# Patient Record
Sex: MALE | Race: WHITE | NOT HISPANIC OR LATINO | Employment: OTHER | URBAN - METROPOLITAN AREA
[De-identification: names, ages, dates, MRNs, and addresses within clinical notes are randomized per-mention and may not be internally consistent; named-entity substitution may affect disease eponyms.]

---

## 2019-10-17 ENCOUNTER — OFFICE VISIT (OUTPATIENT)
Dept: UROLOGY | Facility: CLINIC | Age: 69
End: 2019-10-17
Payer: MEDICARE

## 2019-10-17 VITALS
HEIGHT: 74 IN | SYSTOLIC BLOOD PRESSURE: 112 MMHG | DIASTOLIC BLOOD PRESSURE: 66 MMHG | BODY MASS INDEX: 34.65 KG/M2 | RESPIRATION RATE: 18 BRPM | HEART RATE: 95 BPM | WEIGHT: 270 LBS

## 2019-10-17 DIAGNOSIS — Z90.79 STATUS POST PROSTATECTOMY: ICD-10-CM

## 2019-10-17 DIAGNOSIS — N39.45 URINARY INCONTINENCE WITH CONTINUOUS LEAKAGE: Primary | ICD-10-CM

## 2019-10-17 DIAGNOSIS — C61: ICD-10-CM

## 2019-10-17 PROBLEM — I10 HYPERTENSION: Status: ACTIVE | Noted: 2019-10-17

## 2019-10-17 PROBLEM — N19 ACUTE PRERENAL AZOTEMIA: Status: ACTIVE | Noted: 2019-10-17

## 2019-10-17 PROBLEM — J18.9 PNEUMONIA: Status: ACTIVE | Noted: 2019-10-17

## 2019-10-17 PROBLEM — E78.5 HYPERLIPIDEMIA: Status: ACTIVE | Noted: 2019-10-17

## 2019-10-17 PROCEDURE — 51798 US URINE CAPACITY MEASURE: CPT | Mod: ICN,CMP,S$GLB, | Performed by: NURSE PRACTITIONER

## 2019-10-17 PROCEDURE — 99213 OFFICE O/P EST LOW 20 MIN: CPT | Mod: 25,ICN,CMP,S$GLB | Performed by: NURSE PRACTITIONER

## 2019-10-17 PROCEDURE — 51798 PR MEAS,POST-VOID RES,US,NON-IMAGING: ICD-10-PCS | Mod: ICN,CMP,S$GLB, | Performed by: NURSE PRACTITIONER

## 2019-10-17 PROCEDURE — 99213 PR OFFICE/OUTPT VISIT, EST, LEVL III, 20-29 MIN: ICD-10-PCS | Mod: 25,ICN,CMP,S$GLB | Performed by: NURSE PRACTITIONER

## 2019-10-17 RX ORDER — PHENYLEPHRINE HCL 10 MG
1000 TABLET ORAL
COMMUNITY
End: 2021-04-01

## 2019-10-17 RX ORDER — PANTOPRAZOLE SODIUM 40 MG/1
40 TABLET, DELAYED RELEASE ORAL
COMMUNITY

## 2019-10-17 RX ORDER — DICLOFENAC SODIUM 75 MG/1
75 TABLET, DELAYED RELEASE ORAL
COMMUNITY

## 2019-10-17 RX ORDER — METFORMIN HYDROCHLORIDE 1000 MG/1
1000 TABLET ORAL
COMMUNITY
End: 2020-06-08

## 2019-10-17 RX ORDER — AMLODIPINE BESYLATE 5 MG/1
5 TABLET ORAL
COMMUNITY
End: 2019-10-17 | Stop reason: CLARIF

## 2019-10-17 RX ORDER — PRAVASTATIN SODIUM 10 MG/1
10 TABLET ORAL
COMMUNITY

## 2019-10-17 RX ORDER — MONTELUKAST SODIUM 10 MG/1
10 TABLET ORAL
COMMUNITY

## 2019-10-17 RX ORDER — AMLODIPINE BESYLATE 5 MG/1
5 TABLET ORAL DAILY
COMMUNITY
End: 2019-10-17 | Stop reason: CLARIF

## 2019-10-17 RX ORDER — ERGOCALCIFEROL 1.25 MG/1
CAPSULE ORAL
COMMUNITY
End: 2020-04-30

## 2019-10-17 RX ORDER — LOSARTAN POTASSIUM AND HYDROCHLOROTHIAZIDE 25; 100 MG/1; MG/1
TABLET ORAL
COMMUNITY
End: 2020-06-08

## 2019-10-17 RX ORDER — ASPIRIN 81 MG/1
81 TABLET ORAL
COMMUNITY

## 2019-10-17 NOTE — PROGRESS NOTES
Chief Complaint: postoperative urinary incontinence    HPI:    10/17/19: 69-year-old male who presents today with complaints of continuous urinary incontinence status post prostatectomy performed on 09/30/2019.  Procedure performed without complication and he was discharged home as scheduled.  Patient had Crow 8 disease and underwent prostatectomy as a 1st step in a multi modality treatment algorithm. He was then hospitalized from 10/6/19- 10/9/19 for treatment of UTI, which was treated successfully. He then returned to clinic on 10/11/19 for removal of Mathis catheter, cystogram revealed complete healing at the urethrovesical anastomosis.  Postop education provided on the need for extensive Kegel exercises as well as penile rehabilitation.  Patient reports today that since catheter removal he is having continuous urinary incontinence requiring the need for multiple Pull-Up changes per day.  He has attempted timed voiding, however, he states that as soon as he stands up urine begins to leak down his leg.  He is also wetting the bed at night. He denies any pain or blood noted in urine. No other urinary complaints such as burning with urination or malodorous urine. No other post op complaints.     Allergies:  Patient has no known allergies.    Medications:  has a current medication list which includes the following prescription(s): aspirin, cinnamon bark, diclofenac, ergocalciferol, losartan-hydrochlorothiazide 100-25 mg, metformin, montelukast, pantoprazole, and pravastatin.    Review of Systems:  General: No fever, chills, fatigability, or weight loss.  Skin: No rashes, itching, or changes in color or texture of skin.  Chest: Denies BARCENAS, cyanosis, wheezing, cough, and sputum production.  Abdomen: Appetite fine. No weight loss. Denies diarrhea, abdominal pain, hematemesis, or blood in stool.  Musculoskeletal: No joint stiffness or swelling. Denies back pain.  : As above.  All other review of systems  negative.    PMH:   has a past medical history of Diabetes mellitus, Hypertension, and Prostate cancer.    PSH:   has a past surgical history that includes Prostate surgery; Hemorrhoid surgery; and Tonsillectomy.    FamHx: family history includes Cancer in his brother; Stroke in his mother.    SocHx:  reports that he has never smoked. He does not have any smokeless tobacco history on file. His alcohol and drug histories are not on file.      Physical Exam:  Vitals:    10/17/19 0939   BP: 112/66   Pulse: 95   Resp: 18     General: A&Ox3, no apparent distress, no deformities  Neck: No masses, normal thyroid  Lungs: normal inspiration, no use of accessory muscles  Heart: normal pulse, no arrhythmias  Abdomen: Soft, NT, ND, no masses, no hernias, no hepatosplenomegaly  Lymphatic: Neck and groin nodes negative  Skin: The skin is warm and dry. No jaundice.  Ext: No c/c/e.  : deferred    Labs/Studies: bladder scan 0ml    Impression/Plan: Urinary incontinence with continuous leakage  Comments:  Continue timed voiding, Re-educated by LPN on Kegel exercises & penile rehabilitation, will reassess at next follow up  Orders:  -     Bladder scan    Malignant neoplasm of prostate with intermediate recurrence risk, stage T2B-C or Crow 7 or or prostate-specific antigen (PSA) 10-20  Comments:  s/p prostatectomy, will check 1st post op PSA at next appt    Status post prostatectomy  Comments:  Continue post prostatectomy care    Follow up in about 4 weeks (around 11/14/2019) for PSA.

## 2019-10-30 ENCOUNTER — TELEPHONE (OUTPATIENT)
Dept: UROLOGY | Facility: CLINIC | Age: 69
End: 2019-10-30

## 2019-10-30 NOTE — TELEPHONE ENCOUNTER
This pt/pt wife is requesting therapy bc he is still incontinent and thinks he is not doing exerices right. Pt does have kegel paper we give at office.     ----- Message from Raj Mobley sent at 10/29/2019  2:25 PM CDT -----  Contact: Pt  Kulwant called inquiring about how to perform Kegel exercises since he is still incontinent after his prostate surgery. Please call him at [phone] or [phone].

## 2019-11-14 ENCOUNTER — OFFICE VISIT (OUTPATIENT)
Dept: UROLOGY | Facility: CLINIC | Age: 69
End: 2019-11-14
Payer: MEDICARE

## 2019-11-14 VITALS
WEIGHT: 265 LBS | BODY MASS INDEX: 34.01 KG/M2 | HEIGHT: 74 IN | SYSTOLIC BLOOD PRESSURE: 127 MMHG | DIASTOLIC BLOOD PRESSURE: 58 MMHG | RESPIRATION RATE: 18 BRPM | HEART RATE: 99 BPM

## 2019-11-14 DIAGNOSIS — C61: Primary | ICD-10-CM

## 2019-11-14 DIAGNOSIS — N39.45 URINARY INCONTINENCE WITH CONTINUOUS LEAKAGE: ICD-10-CM

## 2019-11-14 DIAGNOSIS — Z90.79 STATUS POST PROSTATECTOMY: ICD-10-CM

## 2019-11-14 LAB — PSA, DIAGNOSTIC: < 0.01 NG/ML (ref 0.1–4)

## 2019-11-14 PROCEDURE — 99214 PR OFFICE/OUTPT VISIT, EST, LEVL IV, 30-39 MIN: ICD-10-PCS | Mod: S$GLB,,, | Performed by: NURSE PRACTITIONER

## 2019-11-14 PROCEDURE — 99214 OFFICE O/P EST MOD 30 MIN: CPT | Mod: S$GLB,,, | Performed by: NURSE PRACTITIONER

## 2019-11-14 RX ORDER — NYSTATIN 100000 U/G
CREAM TOPICAL 2 TIMES DAILY
Qty: 30 G | Refills: 3 | Status: SHIPPED | OUTPATIENT
Start: 2019-11-14 | End: 2020-04-30

## 2019-11-14 NOTE — PROGRESS NOTES
Chief Complaint: postoperative urinary incontinence    HPI:    69-year-old male known to Dr. Rene who presents today with continued complaints of continuous urinary incontinence status post prostatectomy performed on 09/30/2019.  Procedure performed without complication and he was discharged home as scheduled.  Patient had Crow 8 disease and underwent prostatectomy as a 1st step in a multi modality treatment algorithm. He was then hospitalized from 10/6/19- 10/9/19 for treatment of UTI, which was treated successfully. He then returned to clinic on 10/11/19 for removal of Mathis catheter, cystogram revealed complete healing at the urethrovesical anastomosis.  Postop education provided on the need for extensive Kegel exercises as well as penile rehabilitation.  Patient reports that since catheter removal he continues to have continuous urinary incontinence requiring the need for multiple Pull-Up changes per day- .  He has attempted timed voiding, however, he states that as soon as he stands up urine begins to leak down his leg.  His wife is unsure if he is performing the Kegel exercises correctly as the symptoms are not improving.  He continues to wet the bed at night, they are using Jignesh's Butt Paste & OTC antifungal but his groin area has become significantly red and irritated. He denies any pain or blood noted in urine. No other urinary complaints such as burning with urination or malodorous urine. No other post op complaints.     Allergies:  Patient has no known allergies.    Medications:  has a current medication list which includes the following prescription(s): aspirin, cinnamon bark, diclofenac, ergocalciferol, losartan-hydrochlorothiazide 100-25 mg, metformin, montelukast, nystatin, pantoprazole, and pravastatin.    Review of Systems:  General: No fever, chills, fatigability, or weight loss.  Skin: No rashes, itching, or changes in color or texture of skin.  Chest: Denies BARCENAS, cyanosis, wheezing,  cough, and sputum production.  Abdomen: Appetite fine. No weight loss. Denies diarrhea, abdominal pain, hematemesis, or blood in stool.  Musculoskeletal: No joint stiffness or swelling. Denies back pain.  : As above.  All other review of systems negative.    PMH:   has a past medical history of Diabetes mellitus, Hypertension, and Prostate cancer.    PSH:   has a past surgical history that includes Prostate surgery; Hemorrhoid surgery; and Tonsillectomy.    FamHx: family history includes Cancer in his brother; Stroke in his mother.    SocHx:  reports that he has never smoked. He does not have any smokeless tobacco history on file. He reports that he does not drink alcohol or use drugs.      Physical Exam:  Vitals:    11/14/19 0948   BP: (!) 127/58   Pulse: 99   Resp: 18     General: A&Ox3, no apparent distress, no deformities  Neck: No masses, normal thyroid  Lungs: normal inspiration, no use of accessory muscles  Heart: normal pulse, no arrhythmias  Abdomen: Soft, NT, ND, no masses, no hernias, no hepatosplenomegaly  Lymphatic: Neck and groin nodes negative  Skin: The skin is warm and dry. No jaundice.  Ext: No c/c/e.  : deferred    Labs/Studies: none    Impression/Plan: Malignant neoplasm of prostate with intermediate recurrence risk, stage T2B-C or Crow 7 or or prostate-specific antigen (PSA) 10-20  Comments:  s/p prostatectomy, PSA due to be checked today, will call pt with results  Orders:  -     Prostate Specific Antigen, Diagnostic    Urinary incontinence with continuous leakage  Comments:  referral sent to PT for pelvic floor therapy, continue Kegel exercises  Orders:  -     nystatin (MYCOSTATIN) cream; Apply topically 2 (two) times daily.  Dispense: 30 g; Refill: 3    Status post prostatectomy  Comments:  performed on 9/30/19 by Dr. Rene without complication    Follow up in about 3 months (around 2/14/2020) for f/u prostate cancer.

## 2019-11-15 ENCOUNTER — TELEPHONE (OUTPATIENT)
Dept: UROLOGY | Facility: CLINIC | Age: 69
End: 2019-11-15

## 2019-11-15 NOTE — TELEPHONE ENCOUNTER
----- Message from Chelsi Egan NP sent at 11/15/2019  1:45 PM CST -----  PSA undetectable, please call patient with results

## 2020-02-14 ENCOUNTER — TELEPHONE (OUTPATIENT)
Dept: UROLOGY | Facility: CLINIC | Age: 70
End: 2020-02-14

## 2020-02-14 ENCOUNTER — OFFICE VISIT (OUTPATIENT)
Dept: UROLOGY | Facility: CLINIC | Age: 70
End: 2020-02-14
Payer: MEDICARE

## 2020-02-14 VITALS
HEIGHT: 74 IN | HEART RATE: 91 BPM | SYSTOLIC BLOOD PRESSURE: 119 MMHG | RESPIRATION RATE: 12 BRPM | WEIGHT: 270 LBS | DIASTOLIC BLOOD PRESSURE: 78 MMHG | BODY MASS INDEX: 34.65 KG/M2

## 2020-02-14 DIAGNOSIS — C61 PROSTATE CANCER: Primary | ICD-10-CM

## 2020-02-14 DIAGNOSIS — N39.3 SUI (STRESS URINARY INCONTINENCE), MALE: ICD-10-CM

## 2020-02-14 LAB — PSA, DIAGNOSTIC: < 0.01 NG/ML (ref 0.1–4)

## 2020-02-14 PROCEDURE — 99213 PR OFFICE/OUTPT VISIT, EST, LEVL III, 20-29 MIN: ICD-10-PCS | Mod: S$GLB,,, | Performed by: SPECIALIST

## 2020-02-14 PROCEDURE — 99213 OFFICE O/P EST LOW 20 MIN: CPT | Mod: S$GLB,,, | Performed by: SPECIALIST

## 2020-02-14 RX ORDER — AMLODIPINE BESYLATE 5 MG/1
TABLET ORAL
COMMUNITY
Start: 2020-01-10 | End: 2021-04-01

## 2020-02-14 RX ORDER — TURMERIC 400 MG
CAPSULE ORAL
COMMUNITY
End: 2020-06-08

## 2020-02-14 NOTE — PROGRESS NOTES
Subjective:       Patient ID: Kulwant Hernandez is a 69 y.o. male.    Chief Complaint: Prostate Cancer (3 mth fu)      HPI:  69-year-old  man who underwent a robotic prostatectomy at [facility] on September 30, 2019 for high risk prostate cancer.  This was the 1st step in the multi modality treatment approach.    His hospital course was uncomplicated.  Following the procedure he was discharged home he developed a urinary tract infection who was readmitted to the hospital and was successfully treated.  He return to the clinic a cystogram showed complete healing at the urethrovesical anastomosis and his Mathis catheter was removed.    Since then he has been doing well other than persistent incontinence.  He has seen some improvement albeit slowly.  He reports that when he lays on in bed at night he can tell that his bladder is full he does not leak in the bed.  Initially when he was standing up against gravity the urine which is poor down but recently he has seen a little bit of an improvement in such a way that when he stands he is still able to at least pulled out his pants be able to void in a normal fashion. A few months ago he requested to be referred to pelvic floor physical therapy.  He is reporting today that he does not think is making a difference for him which I completely agree.  Even prior to physical therapy he was not doing his Kegel exercises religiously.    He did obtain a vacuum device for erections but he is not using it regularly.    His pathology from the surgery showed no lymph node involvement he had a primary histologic grade of Crow 4 + 3 equals 7 with 55% of pattern for reported there was seminal vesicular invasion on the right side margins were minimally involved and extraprostatic extension was also involved.  He had pathologic pT3b pN0 adenocarcinoma of the prostate.  Tumor was reported on both sides of the prostate without any lymphovascular invasion. Tumor  involving was about 25% of the prostate volume.  There was perineural invasion reported.    Past Medical History:   Past Medical History:   Diagnosis Date    Allergy     Diabetes mellitus     GERD (gastroesophageal reflux disease)     Hypercholesteremia     Hypertension     Prostate cancer        Past Surgical Historical:   Past Surgical History:   Procedure Laterality Date    bx vocal cords      HEMORRHOID SURGERY      PROSTATE SURGERY      TONSILLECTOMY          Medications:   Medication List with Changes/Refills   Current Medications    AMLODIPINE (NORVASC) 5 MG TABLET        ASPIRIN (ECOTRIN) 81 MG EC TABLET    81 mg.    CINNAMON BARK (CINNAMON) 500 MG CAPSULE    1,000 mg.    DICLOFENAC (VOLTAREN) 75 MG EC TABLET    75 mg.    ERGOCALCIFEROL (ERGOCALCIFEROL) 50,000 UNIT CAP    Every 7 Days    LOSARTAN-HYDROCHLOROTHIAZIDE 100-25 MG (HYZAAR) 100-25 MG PER TABLET    Daily    METFORMIN (GLUCOPHAGE) 1000 MG TABLET    1,000 mg.    MONTELUKAST (SINGULAIR) 10 MG TABLET    10 mg.    NYSTATIN (MYCOSTATIN) CREAM    Apply topically 2 (two) times daily.    PANTOPRAZOLE (PROTONIX) 40 MG TABLET    40 mg.    PRAVASTATIN (PRAVACHOL) 10 MG TABLET    10 mg.    TURMERIC 400 MG CAP            Past Social History:   Social History     Socioeconomic History    Marital status:      Spouse name: Not on file    Number of children: Not on file    Years of education: Not on file    Highest education level: Not on file   Occupational History    Not on file   Social Needs    Financial resource strain: Not on file    Food insecurity:     Worry: Not on file     Inability: Not on file    Transportation needs:     Medical: Not on file     Non-medical: Not on file   Tobacco Use    Smoking status: Never Smoker   Substance and Sexual Activity    Alcohol use: Never     Frequency: Never    Drug use: Never    Sexual activity: Not on file   Lifestyle    Physical activity:     Days per week: Not on file     Minutes per  session: Not on file    Stress: Not on file   Relationships    Social connections:     Talks on phone: Not on file     Gets together: Not on file     Attends Confucianist service: Not on file     Active member of club or organization: Not on file     Attends meetings of clubs or organizations: Not on file     Relationship status: Not on file   Other Topics Concern    Not on file   Social History Narrative    Not on file       Allergies: Review of patient's allergies indicates:  No Known Allergies     Family History:   Family History   Problem Relation Age of Onset    Stroke Mother     Cancer Brother         Review of Systems:   systems reviewed and notable for stress urinary incontinence following prostatectomy  All other systems were reviewed Neg except as stated in the HPI    Physical Exam:  General: A&Ox3. No apparent distress. No deformities.  Neck: No masses. Normal thyroid.  Lungs: normal inspiration. No use of accessory muscles.  Heart: normal pulse. No arrhythmias.  Abdomen: Soft. NT. ND. No masses. No hernias. No hepatosplenomegaly.  Lymphatic: Neck and groin nodes negative.  Skin: The skin is warm and dry. No jaundice.  Neurology: Cranial nerves 2-12 crossly intact, no focal weaknesses, no sensation deficits, no motor deficits  Ext: No clubbing, cyanosis or edema.  :  Deferred      Assessment/Plan:       69-year-old man with intermediate risk prostate cancer status post prostatectomy.  He had some adverse pathologic features on final pathology report.  Patient is doing well postprostatectomy urinary incontinence and erectile dysfunction.    1.  We are going to recommend for him to stop the pelvic floor physical therapy since he is not making a difference.  I am going to commit him to Kegel exercises.  I reinstructed him and retrain him how to do these exercises.  I need him to do him at least 100 of them a day.  Particularly inform him that he needs to hold his urine midstream to reassure him that  he is squeezing the right muscle group  2.  Obtain blood for serum PSA today give him a call with the results  3.  Return to clinic in 3 months.  4.  Resume the usage of vacuum device for erections once a day.    Problem List Items Addressed This Visit     None      Visit Diagnoses     Prostate cancer    -  Primary    WENDY (stress urinary incontinence), male

## 2020-03-04 ENCOUNTER — TELEPHONE (OUTPATIENT)
Dept: UROLOGY | Facility: CLINIC | Age: 70
End: 2020-03-04

## 2020-04-29 ENCOUNTER — TELEPHONE (OUTPATIENT)
Dept: UROLOGY | Facility: CLINIC | Age: 70
End: 2020-04-29

## 2020-04-29 NOTE — TELEPHONE ENCOUNTER
Call returned and spoke w/ wife. Pt's wife states that pt has been having blood in urine since last week. Pt went to [facility] in [place] last Friday and had UA culture done and was also prescribed Nitrofurantoin. Pt's wife states that their office called this morning and was told pt's UA culture was negative. Pt is requesting to see JENNA and appt is scheduled for 04/30/2020 at 0910. Pt's wife verbalized understanding.    UA results will be requested from [facility].    ----- Message from Raj Mobley sent at 4/29/2020  9:11 AM CDT -----  Contact: Pt  Please call Kulwant to discuss his gross hematuria [phone].

## 2020-04-30 ENCOUNTER — OFFICE VISIT (OUTPATIENT)
Dept: UROLOGY | Facility: CLINIC | Age: 70
End: 2020-04-30
Payer: MEDICARE

## 2020-04-30 ENCOUNTER — TELEPHONE (OUTPATIENT)
Dept: UROLOGY | Facility: CLINIC | Age: 70
End: 2020-04-30

## 2020-04-30 VITALS
RESPIRATION RATE: 18 BRPM | HEIGHT: 74 IN | SYSTOLIC BLOOD PRESSURE: 139 MMHG | DIASTOLIC BLOOD PRESSURE: 86 MMHG | HEART RATE: 104 BPM | WEIGHT: 270 LBS | BODY MASS INDEX: 34.65 KG/M2

## 2020-04-30 DIAGNOSIS — R31.0 GROSS HEMATURIA: Primary | ICD-10-CM

## 2020-04-30 DIAGNOSIS — N30.01 ACUTE CYSTITIS WITH HEMATURIA: ICD-10-CM

## 2020-04-30 DIAGNOSIS — C61 PROSTATE CANCER: ICD-10-CM

## 2020-04-30 DIAGNOSIS — Z90.79 S/P PROSTATECTOMY: ICD-10-CM

## 2020-04-30 LAB
ANION GAP SERPL CALC-SCNC: 17 MMOL/L (ref 8–17)
BILIRUB UR QL STRIP: NEGATIVE
BUN/CREAT SERPL: 18.3 (ref 6–20)
CALCIUM SERPL-MCNC: 9.5 MG/DL (ref 8.6–10.2)
CARBON DIOXIDE, CO2: 24 MMOL/L (ref 22–29)
CHLORIDE: 95 MMOL/L (ref 98–107)
CREAT SERPL-MCNC: 1.02 MG/DL (ref 0.7–1.2)
GFR ESTIMATION: 72.2
GLUCOSE UR QL STRIP: POSITIVE
GLUCOSE: 392 MG/DL (ref 82–115)
KETONES UR QL STRIP: POSITIVE
LEUKOCYTE ESTERASE UR QL STRIP: POSITIVE
PH, POC UA: 5.5
POC AMORP, URINE: ABNORMAL
POC BACTI, URINE: ABNORMAL
POC BLOOD, URINE: POSITIVE
POC CASTS, URINE: ABNORMAL
POC CRYST, URINE: ABNORMAL
POC EPITH, URINE: NEGATIVE
POC HCG, URINE: ABNORMAL
POC HYALIN, URINE: 0 LPF
POC MUCUS, URINE: ABNORMAL
POC NITRATES, URINE: NEGATIVE
POC OTHER, URINE: ABNORMAL
POC RBC, URINE: ABNORMAL HPF
POC WBC, URINE: ABNORMAL HPF
POTASSIUM: 3.8 MMOL/L (ref 3.5–5.1)
PROT UR QL STRIP: NEGATIVE
PSA, DIAGNOSTIC: <0.014 NG/ML (ref 0–4)
SODIUM: 136 MMOL/L (ref 136–145)
SP GR UR STRIP: 1.01 (ref 1–1.03)
UREA NITROGEN (BUN): 18.7 MG/DL (ref 8–23)
UROBILINOGEN UR STRIP-ACNC: 0.2 (ref 0.3–2.2)

## 2020-04-30 PROCEDURE — 81001 PR  URINALYSIS, AUTO, W/SCOPE: ICD-10-PCS | Mod: S$GLB,,, | Performed by: NURSE PRACTITIONER

## 2020-04-30 PROCEDURE — 81001 URINALYSIS AUTO W/SCOPE: CPT | Mod: S$GLB,,, | Performed by: NURSE PRACTITIONER

## 2020-04-30 PROCEDURE — 99213 PR OFFICE/OUTPT VISIT, EST, LEVL III, 20-29 MIN: ICD-10-PCS | Mod: 25,S$GLB,, | Performed by: NURSE PRACTITIONER

## 2020-04-30 PROCEDURE — 36415 COLL VENOUS BLD VENIPUNCTURE: CPT | Mod: S$GLB,,, | Performed by: NURSE PRACTITIONER

## 2020-04-30 PROCEDURE — 36415 PR COLLECTION VENOUS BLOOD,VENIPUNCTURE: ICD-10-PCS | Mod: S$GLB,,, | Performed by: NURSE PRACTITIONER

## 2020-04-30 PROCEDURE — 99213 OFFICE O/P EST LOW 20 MIN: CPT | Mod: 25,S$GLB,, | Performed by: NURSE PRACTITIONER

## 2020-04-30 RX ORDER — NITROFURANTOIN 25; 75 MG/1; MG/1
CAPSULE ORAL
COMMUNITY
Start: 2020-04-22 | End: 2020-05-15

## 2020-04-30 NOTE — TELEPHONE ENCOUNTER
Spoke w/ pt's wife and she was informed of pt's lab results and metformin protocol.    Pt's wife verbalized understanding.     ----- Message from Chelsi Egan NP sent at 4/30/2020  1:09 PM CDT -----  Also notify him of the metformin protocol

## 2020-04-30 NOTE — PROGRESS NOTES
Chief Complaint:   Chief Complaint   Patient presents with    Hematuria     1 wk/went urgent care in [facility]       HPI:  70-year-old  male known to the service of Dr. Rene who presents with complaints of hematuria.  Patient initially noted blood in his urine 1 week ago, presented to [facility] in [place] and was diagnosed with UTI and prescribed nitrofurantoin (still on medication currently).  He states that the hematuria resolved, but then he noted it again in his urine yesterday.  He reports intermittent burning with urination.  He denies previous workup for hematuria.  He is a nonsmoker.  No known family history of bladder cancer.  No significant chemical exposure.  His final culture results from Urgent Care on 04/22/2020 reveals mixed organisms, consistent with contaminated specimen.    He has high risk prostate cancer.  He underwent a robotic prostatectomy on September 30, 2019.  His PSA has remained undetectable.  He is due for repeat PSA check today.    He was having persistent incontinence post surgery.  He states that this symptom has almost completely resolved.    His final pathology showed primary histologic grade of Crow 4+3=7 with 55% pattern 4 reported.  There was no lymph node involvement.  There was seminal vesicle invasion on the right side.  Margins were minimally involved.  Extraprostatic extension was also involved.  He had pathologic stage pT3b pN0 adenocarcinoma of the prostate.  Tumor was reported on both sides of the prostate without any lymphovascular invasion.  Tumor involving was about 25% of the prostate volume.  There was perineural invasion reported.      Allergies:  Patient has no known allergies.    Medications:  has a current medication list which includes the following prescription(s): amlodipine, aspirin, cinnamon bark, diclofenac, losartan-hydrochlorothiazide 100-25 mg, metformin, montelukast, nitrofurantoin (macrocrystal-monohydrate),  pantoprazole, pravastatin, and turmeric.    Review of Systems:  General: No fever, chills, vision changes, dizziness, weakness, fatigue, unexplained weight loss, confusion, or mood swings.  Skin: No rashes, itching, or changes in color/texture of skin.  Chest: Denies BARCENAS, cyanosis, wheezing, cough, and sputum production.  Abdomen: Appetite fine. Denies diarrhea, abdominal pain, hematemesis, or blood in stool.  Musculoskeletal: No joint stiffness or swelling. No painful lymph nodes.  : reviewed and negative except as stated above in the HPI.  All other review of systems negative.    PMH:   has a past medical history of Allergy, Diabetes mellitus, GERD (gastroesophageal reflux disease), Hypercholesteremia, Hypertension, and Prostate cancer.    PSH:   has a past surgical history that includes Prostate surgery; Hemorrhoid surgery; Tonsillectomy; and bx vocal cords.    FamHx: family history includes Cancer in his brother; Stroke in his mother.    SocHx:  reports that he has never smoked. He does not have any smokeless tobacco history on file. He reports that he does not drink alcohol or use drugs.      Physical Exam:  Vitals:    04/30/20 0906   BP: 139/86   Pulse: 104   Resp: 18     General: AAOx3, no apparent distress, no deformities  Neck: supple, no masses, normal thyroid, full ROM  Lungs: CTAB, no adventitious breath sounds, normal inspiration, no use of accessory muscles  Heart: regular rate and rhythm, no arrhythmias  Abdomen: soft, NT, ND, no masses, no hernias, no hepatosplenomegaly  Lymphatic: no unusually enlarged or tender lymph nodes  Skin: warm and dry, no jaundice, no rash  Ext: without edema or deformity, TRUJILLO, ambulates independently  : deferred    Labs/Studies: UA reveals WBCs 10-16/hpf, RBCs /hpf, epi neg, bact 1+; urine culture from outside facility on 4/22/2020 reveals mixed colonies probable contamination    Impression/Plan:   Gross hematuria  Comments:  no prior workup, UA reveals RBCs  /hpf, will plan for cysto & CT urogram for further eval   Orders:  -     POCT Urinalysis (w/Micro Option)  -     Urine culture  -     Basic metabolic panel    Acute cystitis with hematuria  Comments:  diagnosed by urgent care, currently on nitrofurantoin, UA reveals WBCs 10-16/hpf & bateria 1+ so will resend for culture    Prostate cancer  Comments:  s/p prostatectomy, PSA has remained undetectable, recheck PSA today  Orders:  -     Prostate Specific Antigen, Diagnostic    S/P prostatectomy  Comments:  performed on 9/30/2019        Follow up in about 3 months (around 7/30/2020) for 3m follow up prostate cancer/will also plan for cysto in [facility] for eval of hematuria.

## 2020-05-01 ENCOUNTER — TELEPHONE (OUTPATIENT)
Dept: UROLOGY | Facility: CLINIC | Age: 70
End: 2020-05-01

## 2020-05-01 LAB — URINE CULTURE, ROUTINE: NORMAL

## 2020-05-01 NOTE — TELEPHONE ENCOUNTER
Called pt and spoke w/ pt's wife. Result was given and pt's wife verbalized understanding.     ----- Message from Chelsi Egan NP sent at 5/1/2020  8:12 AM CDT -----  Urine culture reviewed, no growth of bacteria

## 2020-05-07 DIAGNOSIS — N32.0 BNC (BLADDER NECK CONTRACTURE): Primary | ICD-10-CM

## 2020-05-07 DIAGNOSIS — N48.9 PENILE LESION: ICD-10-CM

## 2020-05-15 ENCOUNTER — PROCEDURE VISIT (OUTPATIENT)
Dept: UROLOGY | Facility: CLINIC | Age: 70
End: 2020-05-15
Payer: MEDICARE

## 2020-05-15 VITALS
HEIGHT: 74 IN | BODY MASS INDEX: 34.52 KG/M2 | OXYGEN SATURATION: 95 % | HEART RATE: 111 BPM | SYSTOLIC BLOOD PRESSURE: 121 MMHG | DIASTOLIC BLOOD PRESSURE: 79 MMHG | WEIGHT: 269 LBS | RESPIRATION RATE: 18 BRPM

## 2020-05-15 DIAGNOSIS — R31.0 GROSS HEMATURIA: ICD-10-CM

## 2020-05-15 PROCEDURE — 52000 PR CYSTOURETHROSCOPY: ICD-10-PCS | Mod: S$GLB,,, | Performed by: SPECIALIST

## 2020-05-15 PROCEDURE — 52000 CYSTOURETHROSCOPY: CPT | Mod: S$GLB,,, | Performed by: SPECIALIST

## 2020-05-15 RX ORDER — CIPROFLOXACIN 500 MG/1
500 TABLET ORAL
Status: COMPLETED | OUTPATIENT
Start: 2020-05-15 | End: 2020-05-15

## 2020-05-15 RX ADMIN — CIPROFLOXACIN 500 MG: 500 TABLET ORAL at 03:05

## 2020-05-15 NOTE — PROCEDURES
"Cystoscopy  Date/Time: 5/15/2020 3:20 PM  Performed by: Ronnie Rene MD  Authorized by: Ronnie Rene MD     Consent Done?:  Yes (Written)  Time out: Immediately prior to procedure a "time out" was called to verify the correct patient, procedure, equipment, support staff and site/side marked as required.    Indications: hematuria    Position:  Supine  Anesthesia:  Intraurethral instillation  Preparation: Patient was prepped and draped in usual sterile fashion      Scope type:  Flexible cystoscope  External exam normal: No    Circumcised: No         Urethral Meatus Normal: Yes    Meatal stenosis: No         Condyloma: Yes    Urethra normal: Yes    Prostate normal: No (Surgically absent)    Bladder neck normal: Bladder neck abnormal (bladder neck contracture)    Patient tolerance:  Patient tolerated the procedure well with no immediate complications     Cystoscopy could not be completed because patient had a bladder neck contracture we could not get past this contracture into the bladder.  He also has some lesions on his external genitalia which looks like condyloma.  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  The plan will be to take him to the operating room for bladder neck incision cystoscopy biopsy of external penile lesions.      "

## 2020-05-15 NOTE — PATIENT INSTRUCTIONS
Cystoscopy    Cystoscopy is a procedure that lets your doctor look directly inside your urethra and bladder. It can be used to:  · Help diagnose a problem with your urethra, bladder, or kidneys.  · Take a sample (biopsy) of bladder or urethral tissue.  · Treat certain problems (such as removing kidney stones).  · Place a stent to bypass an obstruction.  · Take special X-rays of the kidneys.  Based on the findings, your doctor may recommend other tests or treatments.  What is a cystoscope?  A cystoscope is a telescope-like instrument that contains lenses and fiberoptics (small glass wires that make bright light). The cystoscope may be straight and rigid, or flexible to bend around curves in the urethra. The doctor may look directly into the cystoscope, or project the image onto a monitor.  Getting ready  · Ask your doctor if you should stop taking any medicines before the procedure.  · Ask whether you should avoid eating or drinking anything after midnight before the procedure.  · Follow any other instructions your doctor gives you.  Tell your doctor before the exam if you:  · Take any medicines, such as aspirin or blood thinners  · Have allergies to any medicines  · Are pregnant   The procedure  Cystoscopy is done in the doctors office, surgery center, or hospital. The doctor and a nurse are present during the procedure. It takes only a few minutes, longer if a biopsy, X-ray, or treatment needs to be done.  During the procedure:  · You lie on an exam table on your back, knees bent and legs apart. You are covered with a drape.  · Your urethra and the area around it are washed. Anesthetic jelly may be applied to numb the urethra. Other pain medicine is usually not needed. In some cases, you may be offered a mild sedative to help you relax. If a more extensive procedure is to be done, such as a biopsy or kidney stone removal, general anesthesia may be needed.  · The cystoscope is inserted. A sterile fluid is put  into the bladder to expand it. You may feel pressure from this fluid.  · When the procedure is done, the cystoscope is removed.  After the procedure  If you had a sedative, general anesthesia, or spinal anesthesia, you must have someone drive you home. Once youre home:  · Drink plenty of fluids.  · You may have burning or light bleeding when you urinate--this is normal.  · Medicines may be prescribed to ease any discomfort or prevent infection. Take these as directed.  · Call your doctor if you have heavy bleeding or blood clots, burning that lasts more than a day, a fever over 100°F  (38° C), or trouble urinating.  Date Last Reviewed: 1/1/2017  © 9343-4610 [employer]. [address] [place] [address], [place] [address]. All rights reserved. This information is not intended as a substitute for professional medical care. Always follow your healthcare professional's instructions.

## 2020-05-26 LAB
ANION GAP SERPL CALC-SCNC: 11 MMOL/L (ref 3–11)
APPEARANCE, UA: CLEAR
APTT PPP: 27 SEC (ref 19.6–32.4)
BACTERIA SPEC CULT: ABNORMAL /HPF
BASOPHILS NFR SNV MANUAL: 1.2 % (ref 0–3)
BILIRUB UR QL STRIP: NEGATIVE MG/DL
BUN SERPL-MCNC: 20 MG/DL (ref 7–18)
BUN/CREAT SERPL: 16.8 RATIO (ref 7–18)
CALCIUM BLD-MCNC: NORMAL MG/DL
CALCIUM SERPL-MCNC: 9 MG/DL (ref 8.8–10.5)
CHLORIDE SERPL-SCNC: 102 MMOL/L (ref 100–108)
CO2 SERPL-SCNC: 28 MMOL/L (ref 21–32)
COLOR UR: ABNORMAL
COVID-19 AB, IGM: NORMAL
CREAT SERPL-MCNC: 1.19 MG/DL (ref 0.7–1.3)
EOSINOPHIL NFR SNV MANUAL: 3 % (ref 1–3)
ERYTHROCYTE [DISTWIDTH] IN BLOOD BY AUTOMATED COUNT: 13.2 % (ref 12.5–18)
GFR ESTIMATION: 60
GLUCOSE (UA): NORMAL MG/DL
GLUCOSE SERPL-MCNC: 131 MG/DL (ref 70–110)
HCT VFR BLD AUTO: 39.8 % (ref 42–52)
HGB BLD-MCNC: 12.5 G/DL (ref 14–18)
HGB UR QL STRIP: 150 /UL
INR PPP: 1.1 INR (ref 0.9–1.1)
KETONES UR QL STRIP: ABNORMAL MG/DL
LEUKOCYTE ESTERASE UR QL STRIP: 500 /UL
LYMPHOCYTES NFR SNV MANUAL: 31.8 % (ref 25–40)
MANUAL NRBC PER 100 CELLS: 0 %
MCH RBC QN AUTO: 29.7 PG (ref 27–31.2)
MCHC RBC AUTO-ENTMCNC: 31.4 G/DL (ref 31.8–35.4)
MCV RBC AUTO: 94.5 FL (ref 80–97)
MONOCYTES/100 LEUKOCYTES: 11.5 % (ref 1–15)
NEUTROPHILS NFR BLD: 2.58 10*3/UL (ref 1.8–7.7)
NEUTROPHILS NFR SNV MANUAL: 52.3 % (ref 37–80)
NITRITE UR QL STRIP: NEGATIVE
PH UR STRIP: 5 PH (ref 5–9)
PLATELETS: 211 10*3/UL (ref 142–424)
POTASSIUM SERPL-SCNC: 4.1 MMOL/L (ref 3.6–5.2)
PROT UR QL STRIP: 30 MG/DL
PROTHROMBIN TIME: 12.5 SEC (ref 10.6–13)
RBC # BLD AUTO: 4.21 10*6/UL (ref 4.7–6.1)
RBC #/AREA URNS HPF: ABNORMAL /HPF (ref 0–2)
SERVICE COMMENT 03: ABNORMAL
SODIUM BLD-SCNC: 141 MMOL/L (ref 135–145)
SP GR UR STRIP: 1.02 (ref 1–1.03)
SPECIMEN COLLECTION METHOD, URINE: ABNORMAL
SQUAMOUS EPITHELIAL, UA: ABNORMAL /LPF
UROBILINOGEN UR STRIP-ACNC: NORMAL MG/DL
WBC # BLD: 4.9 10*3/UL (ref 4.6–10.2)
WBC #/AREA URNS HPF: ABNORMAL /HPF (ref 0–5)

## 2020-06-02 ENCOUNTER — OUTSIDE PLACE OF SERVICE (OUTPATIENT)
Dept: UROLOGY | Facility: CLINIC | Age: 70
End: 2020-06-02
Payer: MEDICARE

## 2020-06-02 LAB
GLUCOSE SERPL-MCNC: 111 MG/DL (ref 70–105)
GLUCOSE SERPL-MCNC: 125 MG/DL (ref 70–105)

## 2020-06-02 PROCEDURE — 52310 PR CYSTOSCOPY,REMV CALCULUS,SIMPLE: ICD-10-PCS | Mod: 59,,, | Performed by: SPECIALIST

## 2020-06-02 PROCEDURE — 54060 PR DESTR PENIS LESN,SIMPL,SURG EXCIS: ICD-10-PCS | Mod: 51,,, | Performed by: SPECIALIST

## 2020-06-02 PROCEDURE — 52640 RELIEVE BLADDER CONTRACTURE: CPT | Mod: ,,, | Performed by: SPECIALIST

## 2020-06-02 PROCEDURE — 11421 PR EXC SKIN BENIG 0.6-1 CM REMAINDR BODY: ICD-10-PCS | Mod: 51,,, | Performed by: SPECIALIST

## 2020-06-02 PROCEDURE — 52640 PR RELIEVE POSTOP BLADDER CONTRACTURE: ICD-10-PCS | Mod: ,,, | Performed by: SPECIALIST

## 2020-06-02 PROCEDURE — 11421 EXC H-F-NK-SP B9+MARG 0.6-1: CPT | Mod: 51,,, | Performed by: SPECIALIST

## 2020-06-02 PROCEDURE — 54060 EXCISION OF PENIS LESION(S): CPT | Mod: 51,,, | Performed by: SPECIALIST

## 2020-06-02 PROCEDURE — 52310 CYSTOSCOPY AND TREATMENT: CPT | Mod: 59,,, | Performed by: SPECIALIST

## 2020-06-08 ENCOUNTER — OFFICE VISIT (OUTPATIENT)
Dept: UROLOGY | Facility: CLINIC | Age: 70
End: 2020-06-08
Payer: MEDICARE

## 2020-06-08 VITALS — SYSTOLIC BLOOD PRESSURE: 124 MMHG | HEART RATE: 94 BPM | DIASTOLIC BLOOD PRESSURE: 65 MMHG

## 2020-06-08 DIAGNOSIS — R31.0 GROSS HEMATURIA: ICD-10-CM

## 2020-06-08 DIAGNOSIS — Z90.79 S/P PROSTATECTOMY: ICD-10-CM

## 2020-06-08 DIAGNOSIS — C61 PROSTATE CANCER: ICD-10-CM

## 2020-06-08 DIAGNOSIS — N32.0 BLADDER NECK CONTRACTURE: Primary | ICD-10-CM

## 2020-06-08 DIAGNOSIS — A63.0 CONDYLOMA ACUMINATUM OF PENIS: ICD-10-CM

## 2020-06-08 PROCEDURE — 99024 PR POST-OP FOLLOW-UP VISIT: ICD-10-PCS | Mod: S$GLB,POP,, | Performed by: SPECIALIST

## 2020-06-08 PROCEDURE — 99024 POSTOP FOLLOW-UP VISIT: CPT | Mod: S$GLB,POP,, | Performed by: SPECIALIST

## 2020-06-08 RX ORDER — TAMSULOSIN HYDROCHLORIDE 0.4 MG/1
CAPSULE ORAL
COMMUNITY
Start: 2020-05-05 | End: 2020-07-30

## 2020-06-08 RX ORDER — HYOSCYAMINE SULFATE 0.125 MG
TABLET ORAL
COMMUNITY
Start: 2020-06-04 | End: 2021-04-01

## 2020-06-08 RX ORDER — BLOOD-GLUCOSE METER
EACH MISCELLANEOUS
COMMUNITY
Start: 2020-05-06 | End: 2021-08-06

## 2020-06-08 RX ORDER — ERGOCALCIFEROL 1.25 MG/1
CAPSULE ORAL
COMMUNITY

## 2020-06-08 RX ORDER — BLOOD SUGAR DIAGNOSTIC
STRIP MISCELLANEOUS
COMMUNITY
Start: 2020-05-06 | End: 2021-08-06

## 2020-06-08 RX ORDER — LOSARTAN POTASSIUM 100 MG/1
TABLET ORAL
COMMUNITY
Start: 2020-06-01

## 2020-06-08 RX ORDER — MELOXICAM 7.5 MG/1
TABLET ORAL
COMMUNITY
Start: 2020-05-18 | End: 2020-07-30

## 2020-06-08 RX ORDER — GLIPIZIDE 10 MG/1
TABLET ORAL
COMMUNITY
Start: 2020-06-04

## 2020-06-08 NOTE — PROGRESS NOTES
Chief Complaint:   Chief Complaint   Patient presents with    Follow-up     espinoza DC       HPI:   70-year-old  male known to the service of Dr. Rene who presents for post-op visit with Espinoza catheter removal.  He presented recently with complaints of gross hematuria, cystoscopy revealed a bladder neck contracture.  Incidentally he also had some penile condyloma.  He underwent transurethral resection of bladder neck contracture, cystourethroscopy with removal of foreign body from the bladder, excision of penile condyloma x2, and Espinoza catheterization on June 2, 2020.  He presents today for Espinoza catheter removal.  Denies any further episodes of blood noted in urine.  No new urological complaints.    Pathology results reviewed, bladder neck tissue showed chronically inflamed fibrous tissue and polarizable foreign body material.  Genital condyloma reveals condyloma acuminatum.     He has high risk prostate cancer.  He underwent a robotic prostatectomy on September 30, 2019.  His PSA has remained undetectable.  He is due for 3 month follow-up on 7/30/2020.     Allergies:  Patient has no known allergies.    Medications:  has a current medication list which includes the following prescription(s): amlodipine, aspirin, cinnamon bark, diclofenac, ergocalciferol, glipizide, hyoscyamine, losartan, meloxicam, montelukast, pantoprazole, pravastatin, relion prime meter, relion prime test strips, and tamsulosin.    Review of Systems:  General: No fever, chills, vision changes, dizziness, weakness, fatigue, unexplained weight loss, confusion, or mood swings.  Skin: No rashes, itching, or changes in color/texture of skin.  Chest: Denies BARCENAS, cyanosis, wheezing, cough, and sputum production.  Abdomen: Appetite fine. Denies diarrhea, abdominal pain, hematemesis, or blood in stool.  Musculoskeletal: No joint stiffness or swelling. No painful lymph nodes.  : reviewed and negative except as stated above in the HPI.  All  other review of systems negative.    PMH:   has a past medical history of Allergy, Diabetes mellitus, GERD (gastroesophageal reflux disease), Hypercholesteremia, Hypertension, and Prostate cancer.    PSH:   has a past surgical history that includes Prostate surgery; Hemorrhoid surgery; Tonsillectomy; and bx vocal cords.    FamHx: family history includes Cancer in his brother; Stroke in his mother.    SocHx:  reports that he has never smoked. He does not have any smokeless tobacco history on file. He reports that he does not drink alcohol or use drugs.      Physical Exam:  Vitals:    06/08/20 1522   BP: 124/65   Pulse: 94     General: AAOx3, no apparent distress, no deformities  Neck: supple, no masses, normal thyroid, full ROM  Lungs: CTAB, no adventitious breath sounds, normal inspiration, no use of accessory muscles  Heart: regular rate and rhythm, no arrhythmias  Abdomen: soft, NT, ND, no masses, no hernias, no hepatosplenomegaly  Lymphatic: no unusually enlarged or tender lymph nodes  Skin: warm and dry, no jaundice, no rash  Ext: without edema or deformity, TRUJILLO, ambulates independently  :  Mathis catheter in place draining a small amount of clear yellow urine    Labs/Studies: pathology results as above    Impression/Plan:   Bladder neck contracture  Comments:  s/p transurethral resection on 6/2/2020, DC Mathis catheter today    Gross hematuria  Comments:  resolved, due to above, s/p transurethral resection of bladder neck contracture, no further episodes  Orders:  -     Nursing communication    Condyloma acuminatum of penis  Comments:  s/p excision on 6/2/2020, pathology confirms diagnosis    Prostate cancer  Comments:  s/p prostatectomy, 3m follow up scheduled in 7/2020    S/P prostatectomy  Comments:  9/30/2019        Follow up in about 7 weeks (around 7/30/2020) for 3m follow up as previously scheduled.

## 2020-06-08 NOTE — PROGRESS NOTES
Patient seen and examined.  Reviewed clinical data.  I discussed the management plan with the nurse practitioner.  I agree with her assessment and plan.      Briefly this is a 70-year-old man with a history of prostate cancer status post robotic prostatectomy September 2019 who developed a bladder neck contracture.  He also had a foreign body in his bladder.  He is now status post removal of this foreign body and excision of a bladder neck contracture.  He is here today for catheter removal.  We also did remove some condyloma on his scrotum on his penis.  We should discuss the pathology with him today.    Plan will be to remove the Mathis catheter today and have patient follow up with us in July of 2020.  I expect him to leak some more and we should talk at that time about some interventions for stress incontinence.

## 2020-06-10 ENCOUNTER — TELEPHONE (OUTPATIENT)
Dept: UROLOGY | Facility: CLINIC | Age: 70
End: 2020-06-10

## 2020-06-10 NOTE — TELEPHONE ENCOUNTER
Spoke with pt spouse and states that pt is having a lot of leakage. She states that he has not symptoms of COVID-19. Spouse to make JENNA aware of the leakage since having the catheter removed. Nurse verbalized that she would notify JENNA. NB      ----- Message from Selena Phelps sent at 6/8/2020 10:45 AM CDT -----  Regarding: COVID FU  SURGERY-6/2/2020

## 2020-06-12 NOTE — TELEPHONE ENCOUNTER
Yes I expected him to leak more after the catheter was removed.  Because he had a foreign body that was obstructing of the bladder neck that was preventing him from leaking.  That would also cause the hematuria.  We can discuss some options based on how long it has been since he had his prostate removed.

## 2020-07-30 ENCOUNTER — OFFICE VISIT (OUTPATIENT)
Dept: UROLOGY | Facility: CLINIC | Age: 70
End: 2020-07-30
Payer: MEDICARE

## 2020-07-30 ENCOUNTER — TELEPHONE (OUTPATIENT)
Dept: UROLOGY | Facility: CLINIC | Age: 70
End: 2020-07-30

## 2020-07-30 VITALS
WEIGHT: 269 LBS | RESPIRATION RATE: 18 BRPM | HEIGHT: 74 IN | SYSTOLIC BLOOD PRESSURE: 121 MMHG | BODY MASS INDEX: 34.52 KG/M2 | HEART RATE: 86 BPM | DIASTOLIC BLOOD PRESSURE: 67 MMHG

## 2020-07-30 DIAGNOSIS — Z90.79 S/P PROSTATECTOMY: ICD-10-CM

## 2020-07-30 DIAGNOSIS — C61 PROSTATE CANCER: Primary | ICD-10-CM

## 2020-07-30 DIAGNOSIS — N39.3 SUI (STRESS URINARY INCONTINENCE), MALE: ICD-10-CM

## 2020-07-30 DIAGNOSIS — N32.0 BLADDER NECK CONTRACTURE: ICD-10-CM

## 2020-07-30 LAB — PSA, DIAGNOSTIC: <0.014 NG/ML (ref 0–4)

## 2020-07-30 PROCEDURE — 99213 OFFICE O/P EST LOW 20 MIN: CPT | Mod: 24,S$GLB,, | Performed by: SPECIALIST

## 2020-07-30 PROCEDURE — 99213 PR OFFICE/OUTPT VISIT, EST, LEVL III, 20-29 MIN: ICD-10-PCS | Mod: 24,S$GLB,, | Performed by: SPECIALIST

## 2020-07-30 PROCEDURE — 36415 COLL VENOUS BLD VENIPUNCTURE: CPT | Mod: S$GLB,,, | Performed by: NURSE PRACTITIONER

## 2020-07-30 PROCEDURE — 36415 PR COLLECTION VENOUS BLOOD,VENIPUNCTURE: ICD-10-PCS | Mod: S$GLB,,, | Performed by: NURSE PRACTITIONER

## 2020-07-30 RX ORDER — METFORMIN HYDROCHLORIDE 1000 MG/1
TABLET ORAL
COMMUNITY
Start: 2020-07-09

## 2020-07-30 RX ORDER — HYDROXYZINE HYDROCHLORIDE 25 MG/1
TABLET, FILM COATED ORAL
COMMUNITY
Start: 2020-07-20 | End: 2021-04-01

## 2020-07-30 NOTE — PROGRESS NOTES
Chief Complaint:   Chief Complaint   Patient presents with    Follow-up     3 mth f/u    Nocturia       HPI:   70-year-old  male known to the service of Dr. Rene who presents for 3 month follow-up prostate cancer.  He has high-risk prostate cancer, underwent a robotic prostatectomy on September 30, 2019.  His PSAs have remained undetectable postoperatively.  He is due for repeat PSA check today.    He developed a bladder neck contracture, also had a foreign body in his bladder.  He is now status post removal of this foreign body and excision of the bladder neck contracture.  He presents today to discuss interventions for stress urinary incontinence as he is leaking continuously during the day, wearing 3-4 pads daily and experiencing nocturia awakening 4-5 times nightly to urinate.    He denies any gross hematuria, burning with urination, urinary retention, or any other new urological complaints.    Allergies:  Patient has no known allergies.    Medications:  has a current medication list which includes the following prescription(s): amlodipine, aspirin, cinnamon bark, diclofenac, ergocalciferol, glipizide, hydroxyzine hcl, hyoscyamine, losartan, metformin, montelukast, pantoprazole, pravastatin, relion prime meter, and relion prime test strips.    Review of Systems:  General: No fever, chills, vision changes, dizziness, weakness, fatigue, unexplained weight loss, confusion, or mood swings.  Skin: No rashes, itching, or changes in color/texture of skin.  Chest: Denies BARCENAS, cyanosis, wheezing, cough, and sputum production.  Abdomen: Appetite fine. Denies diarrhea, abdominal pain, hematemesis, or blood in stool.  Musculoskeletal: No joint stiffness or swelling. No painful lymph nodes.  : reviewed and negative except as stated above in the HPI.  All other review of systems negative.    PMH:   has a past medical history of Allergy, Diabetes mellitus, GERD (gastroesophageal reflux disease),  Hypercholesteremia, Hypertension, and Prostate cancer.    PSH:   has a past surgical history that includes Prostate surgery; Hemorrhoid surgery; Tonsillectomy; and bx vocal cords.    FamHx: family history includes Cancer in his brother; Stroke in his mother.    SocHx:  reports that he has never smoked. He does not have any smokeless tobacco history on file. He reports that he does not drink alcohol or use drugs.      Physical Exam:  Vitals:    07/30/20 0927   BP: 121/67   Pulse: 86   Resp: 18     General: AAOx3, no apparent distress, no deformities  Neck: supple, no masses, normal thyroid, full ROM  Lungs: CTAB, no adventitious breath sounds, normal inspiration, no use of accessory muscles  Heart: regular rate and rhythm, no arrhythmias  Abdomen: soft, NT, ND, no masses, no hernias, no hepatosplenomegaly  Lymphatic: no unusually enlarged or tender lymph nodes  Skin: warm and dry, no jaundice, no rash  Ext: without edema or deformity, TRUJILLO, ambulates independently  : deferred    Labs/Studies: none    Impression/Plan:   Prostate cancer  Comments:  s/p prostatectomy, PSA has remained undetectable, repeat PSA today  Orders:  -     Prostate Specific Antigen, Diagnostic    S/P prostatectomy  Comments:  9/2019    WENDY (stress urinary incontinence), male  Comments:  behavioral modificatiions discussed    Bladder neck contracture  Comments:  s/p excision of bladder neck contracture & removal of foreign body from bladder, no further episodes of hematuria        Follow up in about 4 months (around 11/30/2020).

## 2020-07-30 NOTE — PROGRESS NOTES
Patient seen and examined.  Clinical data reviewed.  Case discussed with the nurse practitioner.  I agree with assessment and plan.    Briefly 70-year-old man with high risk prostate cancer status post robotic prostatectomy September 2019.  PSA has remained undetectable.  He was doing well in terms of incontinence but he had gross hematuria and cystoscopy revealed bladder neck contracture with a foreign body in the bladder neck.  We took him to the operating room 6-20 20 to do an bladder neck resection and removal of the foreign body.  Since then he has been leaking again.  He reports that the leaking has been tapering off.  The plan today will be to repeat a PSA, continue Kegel exercises, have patient return to clinic in 4 months.

## 2020-07-30 NOTE — TELEPHONE ENCOUNTER
Adv Mrs Rosales that Mr Hernandez PSA is undetectable and we will continue to monitor. Verbalized understanding.    ABW    ----- Message from Chelsi Egan NP sent at 7/30/2020 12:07 PM CDT -----  PSA undetectable, please call patient his PSA result

## 2020-11-30 ENCOUNTER — OFFICE VISIT (OUTPATIENT)
Dept: UROLOGY | Facility: CLINIC | Age: 70
End: 2020-11-30
Payer: MEDICARE

## 2020-11-30 VITALS
BODY MASS INDEX: 33.37 KG/M2 | DIASTOLIC BLOOD PRESSURE: 60 MMHG | TEMPERATURE: 97 F | HEIGHT: 74 IN | WEIGHT: 260 LBS | SYSTOLIC BLOOD PRESSURE: 134 MMHG | HEART RATE: 82 BPM

## 2020-11-30 DIAGNOSIS — N52.31 ERECTILE DYSFUNCTION AFTER RADICAL PROSTATECTOMY: ICD-10-CM

## 2020-11-30 DIAGNOSIS — N32.0 BLADDER NECK CONTRACTURE: ICD-10-CM

## 2020-11-30 DIAGNOSIS — N39.3 SUI (STRESS URINARY INCONTINENCE), MALE: ICD-10-CM

## 2020-11-30 DIAGNOSIS — C61 PROSTATE CANCER: Primary | ICD-10-CM

## 2020-11-30 LAB
POC RESIDUAL URINE VOLUME: 0 ML (ref 0–100)
PSA, DIAGNOSTIC: <0.014 NG/ML (ref 0–4)

## 2020-11-30 PROCEDURE — 99213 OFFICE O/P EST LOW 20 MIN: CPT | Mod: S$GLB,,, | Performed by: SPECIALIST

## 2020-11-30 PROCEDURE — 51798 POCT BLADDER SCAN: ICD-10-PCS | Mod: S$GLB,,, | Performed by: SPECIALIST

## 2020-11-30 PROCEDURE — 51798 US URINE CAPACITY MEASURE: CPT | Mod: S$GLB,,, | Performed by: SPECIALIST

## 2020-11-30 PROCEDURE — 99213 PR OFFICE/OUTPT VISIT, EST, LEVL III, 20-29 MIN: ICD-10-PCS | Mod: S$GLB,,, | Performed by: SPECIALIST

## 2020-11-30 NOTE — PROGRESS NOTES
Subjective:       Patient ID: Kulwant Hernandez is a 70 y.o. male.    Chief Complaint: Prostate Cancer      HPI: 70-year-old  male with a history of prostate cancer who presents for 3 month follow-up.  He has high-risk prostate cancer, underwent a robotic prostatectomy on September 30, 2019.  His PSAs have remained undetectable postoperatively.      He complained of gross hematuria.  And in the course a working him up cystoscopy revealed a bladder neck contracture, also had a foreign body in his bladder.  In June 2020 he was taken back to the operating room for resection of the bladder neck contracture as well as removal of a foreign body.  He has done well since then.    His stress urinary incontinence had improved following prostatectomy but then worsened after the bladder neck contracture resection.  Things are getting back to normal now.  He reports that this stream is okay.  BladderScan today was 0 cc.    He also has postprostatectomy erectile dysfunction but satisfied with the status quo.       Past Medical History:   Past Medical History:   Diagnosis Date    Allergy     Diabetes mellitus     GERD (gastroesophageal reflux disease)     Hypercholesteremia     Hypertension     Prostate cancer        Past Surgical Historical:   Past Surgical History:   Procedure Laterality Date    bx vocal cords      HEMORRHOID SURGERY      PROSTATE SURGERY      TONSILLECTOMY          Medications:   Medication List with Changes/Refills   Current Medications    AMLODIPINE (NORVASC) 5 MG TABLET        ASPIRIN (ECOTRIN) 81 MG EC TABLET    81 mg.    CINNAMON BARK (CINNAMON) 500 MG CAPSULE    1,000 mg.    DICLOFENAC (VOLTAREN) 75 MG EC TABLET    75 mg.    ERGOCALCIFEROL (VITAMIN D2) 50,000 UNIT CAP        GLIPIZIDE (GLUCOTROL) 10 MG TABLET        HYDROXYZINE HCL (ATARAX) 25 MG TABLET    TAKE ONE TABLET BY MOUTH EVERY 6 HOURS AS NEEDED FOR ITCHING    HYOSCYAMINE (ANASPAZ,LEVSIN) 0.125 MG TAB        LOSARTAN (COZAAR)  100 MG TABLET        METFORMIN (GLUCOPHAGE) 1000 MG TABLET        MONTELUKAST (SINGULAIR) 10 MG TABLET    10 mg.    PANTOPRAZOLE (PROTONIX) 40 MG TABLET    40 mg.    PRAVASTATIN (PRAVACHOL) 10 MG TABLET    10 mg.    RELION PRIME METER MISC    USE TO CHECK GLUCOSE TWICE DAILY    RELION PRIME TEST STRIPS STRP    USE 1 STRIP TO CHECK GLUCOSE TWICE DAILY        Past Social History:   Social History     Socioeconomic History    Marital status:      Spouse name: Not on file    Number of children: Not on file    Years of education: Not on file    Highest education level: Not on file   Occupational History    Not on file   Social Needs    Financial resource strain: Not on file    Food insecurity     Worry: Not on file     Inability: Not on file    Transportation needs     Medical: Not on file     Non-medical: Not on file   Tobacco Use    Smoking status: Never Smoker   Substance and Sexual Activity    Alcohol use: Never     Frequency: Never    Drug use: Never    Sexual activity: Not on file   Lifestyle    Physical activity     Days per week: Not on file     Minutes per session: Not on file    Stress: Not on file   Relationships    Social connections     Talks on phone: Not on file     Gets together: Not on file     Attends [employer] service: Not on file     Active member of club or organization: Not on file     Attends meetings of clubs or organizations: Not on file     Relationship status: Not on file   Other Topics Concern    Not on file   Social History Narrative    Not on file       Allergies: Review of patient's allergies indicates:  No Known Allergies     Family History:   Family History   Problem Relation Age of Onset    Stroke Mother     Cancer Brother         Review of Systems:   systems reviewed and notable for prostate cancer and postprostatectomy stress incontinence  All other systems were reviewed Neg except as stated in the HPI    Physical Exam:  General: A&Ox3. No apparent distress.  No deformities.  Neck: No masses. Normal thyroid.  Lungs: normal inspiration. No use of accessory muscles.  Heart: normal pulse. No arrhythmias.  Abdomen: Soft. NT. ND. No masses. No hernias. No hepatosplenomegaly.  Lymphatic: Neck and groin nodes negative.  Skin: The skin is warm and dry. No jaundice.  Neurology: Cranial nerves 2-12 crossly intact, no focal weaknesses, no sensation deficits, no motor deficits  Ext: No clubbing, cyanosis or edema.  :  Deferred      Assessment/Plan:       70-year-old man status post robotic prostatectomy in September of 2019, PSA has remained undetectable and he is status post bladder neck contracture resection in June 2020.    1.  Obtain blood for serum PSA today give him a call with the results  2.  He is voiding very well BladderScan today 0 cc.  He still has stress incontinence will continue to observe I recommended mobile Kegel exercises.  3.  Return to clinic in 4 months for interim check.    Problem List Items Addressed This Visit     None      Visit Diagnoses     Prostate cancer    -  Primary    Relevant Orders    Prostate Specific Antigen, Diagnostic    Bladder neck contracture        WENDY (stress urinary incontinence), male        Erectile dysfunction after radical prostatectomy

## 2020-12-01 ENCOUNTER — TELEPHONE (OUTPATIENT)
Dept: UROLOGY | Facility: CLINIC | Age: 70
End: 2020-12-01

## 2020-12-01 NOTE — TELEPHONE ENCOUNTER
----- Message from Ronnie Rene MD sent at 11/30/2020  6:50 PM CST -----  His PSA continues to be undetectable.  Inform him of results.

## 2020-12-07 ENCOUNTER — PATIENT MESSAGE (OUTPATIENT)
Dept: UROLOGY | Facility: CLINIC | Age: 70
End: 2020-12-07

## 2021-04-01 ENCOUNTER — OFFICE VISIT (OUTPATIENT)
Dept: UROLOGY | Facility: CLINIC | Age: 71
End: 2021-04-01
Payer: MEDICARE

## 2021-04-01 VITALS
WEIGHT: 260 LBS | HEIGHT: 74 IN | HEART RATE: 72 BPM | DIASTOLIC BLOOD PRESSURE: 80 MMHG | SYSTOLIC BLOOD PRESSURE: 154 MMHG | BODY MASS INDEX: 33.37 KG/M2

## 2021-04-01 DIAGNOSIS — C61 PROSTATE CANCER: Primary | ICD-10-CM

## 2021-04-01 DIAGNOSIS — Z90.79 S/P PROSTATECTOMY: ICD-10-CM

## 2021-04-01 DIAGNOSIS — R31.0 GROSS HEMATURIA: ICD-10-CM

## 2021-04-01 DIAGNOSIS — N52.31 ERECTILE DYSFUNCTION AFTER RADICAL PROSTATECTOMY: ICD-10-CM

## 2021-04-01 LAB — PSA, DIAGNOSTIC: 0.02 NG/ML (ref 0–4)

## 2021-04-01 PROCEDURE — 36415 COLL VENOUS BLD VENIPUNCTURE: CPT | Mod: S$GLB,,, | Performed by: NURSE PRACTITIONER

## 2021-04-01 PROCEDURE — 81001 PR  URINALYSIS, AUTO, W/SCOPE: ICD-10-PCS | Mod: S$GLB,,, | Performed by: NURSE PRACTITIONER

## 2021-04-01 PROCEDURE — 81001 URINALYSIS AUTO W/SCOPE: CPT | Mod: S$GLB,,, | Performed by: NURSE PRACTITIONER

## 2021-04-01 PROCEDURE — 99214 OFFICE O/P EST MOD 30 MIN: CPT | Mod: 25,S$GLB,, | Performed by: NURSE PRACTITIONER

## 2021-04-01 PROCEDURE — 99214 PR OFFICE/OUTPT VISIT, EST, LEVL IV, 30-39 MIN: ICD-10-PCS | Mod: 25,S$GLB,, | Performed by: NURSE PRACTITIONER

## 2021-04-01 PROCEDURE — 36415 PR COLLECTION VENOUS BLOOD,VENIPUNCTURE: ICD-10-PCS | Mod: S$GLB,,, | Performed by: NURSE PRACTITIONER

## 2021-04-01 RX ORDER — FERROUS GLUCONATE 324(38)MG
TABLET ORAL
COMMUNITY
Start: 2021-03-24 | End: 2021-12-07

## 2021-04-01 RX ORDER — SILDENAFIL CITRATE 20 MG/1
60-100 TABLET ORAL
Qty: 50 TABLET | Refills: 1 | Status: SHIPPED | OUTPATIENT
Start: 2021-04-01 | End: 2021-08-06

## 2021-04-01 RX ORDER — ACETAMINOPHEN 500 MG
TABLET ORAL
COMMUNITY
Start: 2020-09-01

## 2021-04-01 RX ORDER — SILDENAFIL CITRATE 20 MG/1
60-100 TABLET ORAL
Qty: 50 TABLET | Refills: 1 | Status: SHIPPED | OUTPATIENT
Start: 2021-04-01 | End: 2021-04-01

## 2021-04-05 ENCOUNTER — TELEPHONE (OUTPATIENT)
Dept: UROLOGY | Facility: CLINIC | Age: 71
End: 2021-04-05

## 2021-06-23 ENCOUNTER — PATIENT MESSAGE (OUTPATIENT)
Dept: UROLOGY | Facility: CLINIC | Age: 71
End: 2021-06-23

## 2021-06-25 ENCOUNTER — TELEPHONE (OUTPATIENT)
Dept: UROLOGY | Facility: CLINIC | Age: 71
End: 2021-06-25

## 2021-08-06 ENCOUNTER — OFFICE VISIT (OUTPATIENT)
Dept: UROLOGY | Facility: CLINIC | Age: 71
End: 2021-08-06
Payer: MEDICARE

## 2021-08-06 DIAGNOSIS — N39.3 STRESS INCONTINENCE: ICD-10-CM

## 2021-08-06 DIAGNOSIS — C61 MALIGNANT NEOPLASM OF PROSTATE: Primary | ICD-10-CM

## 2021-08-06 LAB — PSA, DIAGNOSTIC: 0.03 NG/ML (ref 0–4)

## 2021-08-06 PROCEDURE — 99214 OFFICE O/P EST MOD 30 MIN: CPT | Mod: S$GLB,,, | Performed by: NURSE PRACTITIONER

## 2021-08-06 PROCEDURE — 99214 PR OFFICE/OUTPT VISIT, EST, LEVL IV, 30-39 MIN: ICD-10-PCS | Mod: S$GLB,,, | Performed by: NURSE PRACTITIONER

## 2021-08-09 ENCOUNTER — TELEPHONE (OUTPATIENT)
Dept: UROLOGY | Facility: CLINIC | Age: 71
End: 2021-08-09

## 2021-12-07 ENCOUNTER — OFFICE VISIT (OUTPATIENT)
Dept: UROLOGY | Facility: CLINIC | Age: 71
End: 2021-12-07
Payer: MEDICARE

## 2021-12-07 VITALS
HEART RATE: 90 BPM | DIASTOLIC BLOOD PRESSURE: 70 MMHG | WEIGHT: 260 LBS | BODY MASS INDEX: 33.37 KG/M2 | HEIGHT: 74 IN | SYSTOLIC BLOOD PRESSURE: 149 MMHG

## 2021-12-07 DIAGNOSIS — N52.31 ERECTILE DYSFUNCTION AFTER RADICAL PROSTATECTOMY: ICD-10-CM

## 2021-12-07 DIAGNOSIS — Z90.79 S/P PROSTATECTOMY: ICD-10-CM

## 2021-12-07 DIAGNOSIS — C61 PROSTATE CANCER: Primary | ICD-10-CM

## 2021-12-07 LAB — PSA, DIAGNOSTIC: 0.03 NG/ML (ref 0–4)

## 2021-12-07 PROCEDURE — 99214 PR OFFICE/OUTPT VISIT, EST, LEVL IV, 30-39 MIN: ICD-10-PCS | Mod: S$GLB,,, | Performed by: UROLOGY

## 2021-12-07 PROCEDURE — 36415 COLL VENOUS BLD VENIPUNCTURE: CPT | Mod: S$GLB,,, | Performed by: NURSE PRACTITIONER

## 2021-12-07 PROCEDURE — 36415 PR COLLECTION VENOUS BLOOD,VENIPUNCTURE: ICD-10-PCS | Mod: S$GLB,,, | Performed by: NURSE PRACTITIONER

## 2021-12-07 PROCEDURE — 99214 OFFICE O/P EST MOD 30 MIN: CPT | Mod: S$GLB,,, | Performed by: UROLOGY

## 2022-06-08 ENCOUNTER — PATIENT MESSAGE (OUTPATIENT)
Dept: UROLOGY | Facility: CLINIC | Age: 72
End: 2022-06-08
Payer: MEDICARE

## 2022-06-09 ENCOUNTER — CLINICAL SUPPORT (OUTPATIENT)
Dept: UROLOGY | Facility: CLINIC | Age: 72
End: 2022-06-09
Payer: MEDICARE

## 2022-06-09 DIAGNOSIS — C61 PROSTATE CANCER: Primary | ICD-10-CM

## 2022-06-09 LAB
PSA, DIAGNOSTIC: 0.05 NG/ML (ref 0–4)
TESTOST SERPL-MCNC: 198 NG/DL (ref 193–740)

## 2022-06-14 ENCOUNTER — OFFICE VISIT (OUTPATIENT)
Dept: UROLOGY | Facility: CLINIC | Age: 72
End: 2022-06-14
Payer: MEDICARE

## 2022-06-14 VITALS
TEMPERATURE: 99 F | WEIGHT: 260 LBS | SYSTOLIC BLOOD PRESSURE: 156 MMHG | BODY MASS INDEX: 33.37 KG/M2 | DIASTOLIC BLOOD PRESSURE: 71 MMHG | HEIGHT: 74 IN | HEART RATE: 93 BPM

## 2022-06-14 DIAGNOSIS — C61 PROSTATE CANCER: Primary | ICD-10-CM

## 2022-06-14 PROCEDURE — 99214 PR OFFICE/OUTPT VISIT, EST, LEVL IV, 30-39 MIN: ICD-10-PCS | Mod: S$GLB,,, | Performed by: UROLOGY

## 2022-06-14 PROCEDURE — 99214 OFFICE O/P EST MOD 30 MIN: CPT | Mod: S$GLB,,, | Performed by: UROLOGY

## 2022-06-14 RX ORDER — SIMVASTATIN 20 MG/1
TABLET, FILM COATED ORAL
COMMUNITY
Start: 2022-06-02

## 2022-06-14 RX ORDER — IRON,CARBONYL/ASCORBIC ACID 100-250 MG
1 TABLET ORAL DAILY
COMMUNITY
Start: 2022-04-27 | End: 2022-06-22

## 2022-06-14 NOTE — PROGRESS NOTES
Subjective:       Patient ID: Kulwant Hernandez is a 72 y.o. male.    Chief Complaint: Other (3 month psa)      HPI:  72-year-old male who underwent radical prostatectomy with Dr. DONNA lockhart in September of 2019 initially his PSAs were undetectable however over the past year they began to register and have had now for steady increases.  Patient is here for follow-up and discussion regarding his rising PSA.  He does have some urinary incontinence which has improved but he has not been doing Kegel exercises    Past Medical History:   Past Medical History:   Diagnosis Date    Allergy     Diabetes mellitus     GERD (gastroesophageal reflux disease)     Hypercholesteremia     Hypertension     Prostate cancer        Past Surgical Historical:   Past Surgical History:   Procedure Laterality Date    bx vocal cords      HEMORRHOID SURGERY      PROSTATE SURGERY      TONSILLECTOMY          Medications:   Medication List with Changes/Refills   Current Medications    ASPIRIN (ECOTRIN) 81 MG EC TABLET    81 mg.    DICLOFENAC (VOLTAREN) 75 MG EC TABLET    75 mg.    ERGOCALCIFEROL (VITAMIN D2) 50,000 UNIT CAP        GLIPIZIDE (GLUCOTROL) 10 MG TABLET        IRON-VITAMIN C 100-250 MG, ICAR-C, 100-250 MG TAB    Take 1 tablet by mouth once daily.    LOSARTAN (COZAAR) 100 MG TABLET        MELATONIN (MELATIN)        METFORMIN (GLUCOPHAGE) 1000 MG TABLET        MONTELUKAST (SINGULAIR) 10 MG TABLET    10 mg.    PANTOPRAZOLE (PROTONIX) 40 MG TABLET    40 mg.    PRAVASTATIN (PRAVACHOL) 10 MG TABLET    10 mg.    SIMVASTATIN (ZOCOR) 20 MG TABLET        ZINC 50 MG TAB            Past Social History:   Social History     Socioeconomic History    Marital status:    Tobacco Use    Smoking status: Never Smoker   Substance and Sexual Activity    Alcohol use: Never    Drug use: Never       Allergies: Review of patient's allergies indicates:  No Known Allergies     Family History:   Family History   Problem Relation Age of Onset     Stroke Mother     Cancer Brother         Review of Systems:  Review of Systems   Constitutional: Negative for activity change and appetite change.   HENT: Negative for congestion and dental problem.    Eyes: Negative for visual disturbance.   Respiratory: Negative for chest tightness and shortness of breath.    Cardiovascular: Negative for chest pain.   Gastrointestinal: Negative for abdominal distention and abdominal pain.   Genitourinary: Negative for decreased urine volume, difficulty urinating, dysuria, enuresis, flank pain, frequency, genital sores, hematuria, penile discharge, penile pain, penile swelling, scrotal swelling, testicular pain and urgency.   Musculoskeletal: Negative for back pain and neck pain.   Skin: Negative for color change.   Neurological: Negative for dizziness.   Hematological: Negative for adenopathy.   Psychiatric/Behavioral: Negative for agitation, behavioral problems and confusion.       Physical Exam:  Physical Exam  Constitutional:       General: He is not in acute distress.     Appearance: He is well-developed.   HENT:      Head: Normocephalic and atraumatic.      Nose: Nose normal.   Eyes:      General: No scleral icterus.     Conjunctiva/sclera: Conjunctivae normal.      Pupils: Pupils are equal, round, and reactive to light.   Neck:      Thyroid: No thyromegaly.      Trachea: No tracheal deviation.   Cardiovascular:      Rate and Rhythm: Normal rate and regular rhythm.      Heart sounds: Normal heart sounds.   Pulmonary:      Effort: Pulmonary effort is normal. No respiratory distress.      Breath sounds: Normal breath sounds. No wheezing or rales.   Abdominal:      General: Bowel sounds are normal. There is no distension.      Palpations: Abdomen is soft.      Tenderness: There is no abdominal tenderness. There is no guarding or rebound.   Genitourinary:     Penis: Normal. No tenderness.       Prostate: Normal.   Musculoskeletal:         General: No deformity. Normal range of  motion.      Cervical back: Neck supple.   Lymphadenopathy:      Cervical: No cervical adenopathy.   Skin:     General: Skin is warm and dry.      Findings: No erythema or rash.   Neurological:      Mental Status: He is alert and oriented to person, place, and time.      Cranial Nerves: No cranial nerve deficit.   Psychiatric:         Behavior: Behavior normal.         Assessment/Plan:       Problem List Items Addressed This Visit    None     Visit Diagnoses     Prostate cancer    -  Primary    Relevant Orders    Ambulatory referral/consult to Oncology             Prostate cancer:  Patient has 4 consecutive rises in his PSA while it is quite low still certainly concerning for PSA failure we will refer patient to Dr. Pete for discussion on possible salvage radiation    Stress incontinence patient was encouraged to do Kegel exercises he only goes through 1 pad every 3 days I encouraged him to do Kegel